# Patient Record
Sex: MALE | Race: WHITE | HISPANIC OR LATINO | Employment: OTHER | URBAN - METROPOLITAN AREA
[De-identification: names, ages, dates, MRNs, and addresses within clinical notes are randomized per-mention and may not be internally consistent; named-entity substitution may affect disease eponyms.]

---

## 2023-07-18 ENCOUNTER — APPOINTMENT (OUTPATIENT)
Dept: GENERAL RADIOLOGY | Facility: CLINIC | Age: 58
End: 2023-07-18
Attending: EMERGENCY MEDICINE
Payer: COMMERCIAL

## 2023-07-18 ENCOUNTER — HOSPITAL ENCOUNTER (EMERGENCY)
Facility: CLINIC | Age: 58
Discharge: HOME | End: 2023-07-18
Attending: EMERGENCY MEDICINE | Admitting: EMERGENCY MEDICINE
Payer: COMMERCIAL

## 2023-07-18 VITALS
SYSTOLIC BLOOD PRESSURE: 136 MMHG | OXYGEN SATURATION: 98 % | TEMPERATURE: 97.3 F | HEART RATE: 61 BPM | DIASTOLIC BLOOD PRESSURE: 87 MMHG | RESPIRATION RATE: 18 BRPM

## 2023-07-18 DIAGNOSIS — S62.657A NONDISPLACED FRACTURE OF MIDDLE PHALANX OF LEFT LITTLE FINGER, INITIAL ENCOUNTER FOR CLOSED FRACTURE: ICD-10-CM

## 2023-07-18 PROCEDURE — 99284 EMERGENCY DEPT VISIT MOD MDM: CPT | Mod: 25

## 2023-07-18 PROCEDURE — 73140 X-RAY EXAM OF FINGER(S): CPT | Mod: LT

## 2023-07-18 PROCEDURE — 250N000013 HC RX MED GY IP 250 OP 250 PS 637: Performed by: EMERGENCY MEDICINE

## 2023-07-18 PROCEDURE — 26720 TREAT FINGER FRACTURE EACH: CPT | Mod: F4

## 2023-07-18 RX ORDER — IBUPROFEN 800 MG/1
800 TABLET, FILM COATED ORAL ONCE
Status: COMPLETED | OUTPATIENT
Start: 2023-07-18 | End: 2023-07-18

## 2023-07-18 RX ADMIN — IBUPROFEN 800 MG: 800 TABLET, FILM COATED ORAL at 12:52

## 2023-07-18 ASSESSMENT — ACTIVITIES OF DAILY LIVING (ADL): ADLS_ACUITY_SCORE: 33
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
## 2023-07-18 NOTE — ED TRIAGE NOTES
used. Pt states a dry wall sheet fell on L hand, pinky finger yesterday. CMS intact. Denies injuries elsewhere.

## 2023-07-18 NOTE — DISCHARGE INSTRUCTIONS
Please use the splint to help immobilize the finger  Follow-up with Hand Surgery at [facility] for recheck later this week.  They may place you in a splint/cast    No heavy lifting with left hand until cleared by specialist    Return to ER with worsening pain, swelling, or any other concerns.

## 2023-09-05 ENCOUNTER — APPOINTMENT (OUTPATIENT)
Dept: GENERAL RADIOLOGY | Facility: CLINIC | Age: 58
End: 2023-09-05
Attending: EMERGENCY MEDICINE
Payer: COMMERCIAL

## 2023-09-05 ENCOUNTER — HOSPITAL ENCOUNTER (EMERGENCY)
Facility: CLINIC | Age: 58
Discharge: HOME | End: 2023-09-05
Attending: EMERGENCY MEDICINE | Admitting: EMERGENCY MEDICINE
Payer: COMMERCIAL

## 2023-09-05 VITALS
TEMPERATURE: 98.4 F | HEART RATE: 72 BPM | HEIGHT: 67 IN | OXYGEN SATURATION: 98 % | SYSTOLIC BLOOD PRESSURE: 138 MMHG | BODY MASS INDEX: 30.45 KG/M2 | DIASTOLIC BLOOD PRESSURE: 87 MMHG | WEIGHT: 194 LBS | RESPIRATION RATE: 18 BRPM

## 2023-09-05 DIAGNOSIS — S69.92XA INJURY OF FINGER OF LEFT HAND, INITIAL ENCOUNTER: ICD-10-CM

## 2023-09-05 PROCEDURE — 99283 EMERGENCY DEPT VISIT LOW MDM: CPT

## 2023-09-05 PROCEDURE — 73140 X-RAY EXAM OF FINGER(S): CPT | Mod: LT

## 2023-09-05 NOTE — ED PROVIDER NOTES
"    History     Chief Complaint:  finger pain  History of performed via professional       HPI   Trevor Palma is a 57 year old male who presents for evaluation of persistent left fifth finger pain.  He states that in mid July, he injured his left pinky finger and was seen in acute care, found to have a fracture of the middle phalanx.  He was splinted at that time and is now discontinued the splint.  Unfortunately, he has ongoing pain with any flexion of the PIP or MCP to the PIP joint.  He has had no additional injury.  He denies redness, swelling, fever, or any other concerns.      Independent Historian:    None    Review of External Notes:  Reviewed 7/28/2023 office visit        Physical Exam   Patient Vitals for the past 24 hrs:   BP Temp Temp src Pulse Resp SpO2 Height Weight   09/05/23 0944 138/87 98.4  F (36.9  C) Temporal 72 18 98 % 1.702 m (5' 7\") 88 kg (194 lb 0.1 oz)        Physical Exam  Constitutional:  Alert, attentive  Cardiovascular:  2+ radial and ulnar pulses to the bilateral upper extremities   Neurological:  5/5 strength to the radian, ulnar and median motor distributions;      sensation intact to light touch to the radian, ulnar and median distributions  MSK:   Normal inspection to the left hand and fingers.  Reduced range of motion at the PIP and MCP due to pain.  Mild tenderness to the left fifth finger PIP.  No redness, swelling, or pain out of proportion to exam  Skin:    Skin is warm and dry.       Emergency Department Course     Imaging:  Fingers XR, 2-3 views, left   Final Result   IMPRESSION: The left small finger is negative for fracture or   dislocation. Again seen is a minimal lucency along the distal margin   of the proximal phalanx but this is unchanged from the prior   examination and could represent a tiny intraosseous ganglion cyst.      KAREN BATES MD            SYSTEM ID:  [id number]        Patient placed in capo tape splint by LEXA, fourth finger to the " fifth finger on the left    Independent Interpretation (X-rays, CTs, rhythm strip):  No acute fracture or dislocation on left finger x-ray       Disposition:  The patient was discharged to home.     Impression & Plan      Medical Decision Making:  This a pleasant 57-year-old male presents for evaluation of ongoing pain to the left fifth finger as described above.  X-ray today shows no residual fracture but the patient continues to have pain.  There is a lucency noted that may be related versus incidental.  Patient has capo taped fourth and fifth finger.  Plan orthopedic hand follow-up for further evaluation of ongoing supportive cares and conservative measures versus operative intervention.      Diagnosis:    ICD-10-CM    1. Injury of finger of left hand, initial encounter  S69.92XA             Kendell Rg MD  09/05/23 1237

## 2025-02-11 ENCOUNTER — OFFICE VISIT (OUTPATIENT)
Dept: INTERNAL MEDICINE | Facility: CLINIC | Age: 60
End: 2025-02-11

## 2025-02-11 VITALS
OXYGEN SATURATION: 96 % | TEMPERATURE: 97.4 F | SYSTOLIC BLOOD PRESSURE: 133 MMHG | HEART RATE: 71 BPM | BODY MASS INDEX: 32.01 KG/M2 | DIASTOLIC BLOOD PRESSURE: 86 MMHG | WEIGHT: 204.4 LBS | RESPIRATION RATE: 18 BRPM

## 2025-02-11 DIAGNOSIS — A37.90 PERTUSSIS: Primary | ICD-10-CM

## 2025-02-11 DIAGNOSIS — R05.1 ACUTE COUGH: ICD-10-CM

## 2025-02-11 DIAGNOSIS — K59.01 SLOW TRANSIT CONSTIPATION: ICD-10-CM

## 2025-02-11 PROBLEM — M62.838 MUSCLE SPASM: Status: ACTIVE | Noted: 2023-01-16

## 2025-02-11 PROBLEM — Z86.0100 PERSONAL HISTORY OF COLON POLYPS, UNSPECIFIED: Status: ACTIVE | Noted: 2025-02-11

## 2025-02-11 PROBLEM — R29.898 WEAKNESS OF RIGHT LOWER EXTREMITY: Status: ACTIVE | Noted: 2017-01-13

## 2025-02-11 PROBLEM — I11.9 HYPERTENSIVE LEFT VENTRICULAR HYPERTROPHY, WITHOUT HEART FAILURE: Status: ACTIVE | Noted: 2024-02-22

## 2025-02-11 PROBLEM — M53.3 SACROILIAC PAIN: Status: ACTIVE | Noted: 2023-01-16

## 2025-02-11 PROBLEM — G89.29 CHRONIC PAIN OF LEFT KNEE: Status: ACTIVE | Noted: 2025-02-11

## 2025-02-11 PROBLEM — E78.5 HYPERLIPIDEMIA: Status: ACTIVE | Noted: 2019-11-04

## 2025-02-11 PROBLEM — M54.6 CHRONIC THORACIC BACK PAIN: Status: ACTIVE | Noted: 2025-02-11

## 2025-02-11 PROBLEM — G89.29 CHRONIC THORACIC BACK PAIN: Status: ACTIVE | Noted: 2025-02-11

## 2025-02-11 PROBLEM — G57.90 ILIOINGUINAL NEURALGIA: Status: ACTIVE | Noted: 2020-11-23

## 2025-02-11 PROBLEM — M47.816 FACET ARTHROPATHY, LUMBAR: Status: ACTIVE | Noted: 2023-01-02

## 2025-02-11 PROBLEM — G89.29 ACUTE EXACERBATION OF CHRONIC LOW BACK PAIN: Status: ACTIVE | Noted: 2022-05-16

## 2025-02-11 PROBLEM — M25.562 CHRONIC PAIN OF LEFT KNEE: Status: ACTIVE | Noted: 2025-02-11

## 2025-02-11 PROBLEM — G51.0 FACIAL PARESIS: Status: ACTIVE | Noted: 2017-01-13

## 2025-02-11 PROBLEM — M51.369 DDD (DEGENERATIVE DISC DISEASE), LUMBAR: Status: ACTIVE | Noted: 2023-01-16

## 2025-02-11 PROBLEM — S83.231A COMPLEX TEAR OF MEDIAL MENISCUS OF RIGHT KNEE AS CURRENT INJURY: Status: ACTIVE | Noted: 2023-12-08

## 2025-02-11 PROBLEM — M17.11 UNILATERAL PRIMARY OSTEOARTHRITIS, RIGHT KNEE: Status: ACTIVE | Noted: 2025-02-11

## 2025-02-11 PROBLEM — M54.50 ACUTE EXACERBATION OF CHRONIC LOW BACK PAIN: Status: ACTIVE | Noted: 2022-05-16

## 2025-02-11 PROBLEM — M25.561 ACUTE PAIN OF RIGHT KNEE: Status: ACTIVE | Noted: 2023-12-08

## 2025-02-11 PROBLEM — R53.82 CHRONIC FATIGUE: Status: ACTIVE | Noted: 2019-11-04

## 2025-02-11 PROCEDURE — 87798 DETECT AGENT NOS DNA AMP: CPT

## 2025-02-11 PROCEDURE — 99204 OFFICE O/P NEW MOD 45 MIN: CPT

## 2025-02-11 RX ORDER — IBUPROFEN 600 MG/1
TABLET, FILM COATED ORAL
COMMUNITY
Start: 2024-02-13 | End: 2025-02-11

## 2025-02-11 RX ORDER — SILDENAFIL CITRATE 20 MG/1
TABLET ORAL
COMMUNITY
Start: 2024-05-07

## 2025-02-11 RX ORDER — SILDENAFIL 100 MG/1
TABLET, FILM COATED ORAL
COMMUNITY
Start: 2024-07-30

## 2025-02-11 RX ORDER — SENNA AND DOCUSATE SODIUM 50; 8.6 MG/1; MG/1
1 TABLET, FILM COATED ORAL AT BEDTIME
Qty: 60 TABLET | Refills: 1 | Status: SHIPPED | OUTPATIENT
Start: 2025-02-11

## 2025-02-11 RX ORDER — NAPROXEN 500 MG/1
TABLET ORAL
COMMUNITY
Start: 2024-02-23 | End: 2025-02-11

## 2025-02-11 RX ORDER — AMOXICILLIN 500 MG/1
1 TABLET, FILM COATED ORAL
COMMUNITY
Start: 2024-05-20 | End: 2025-02-11

## 2025-02-11 RX ORDER — BENZONATATE 200 MG/1
200 CAPSULE ORAL 3 TIMES DAILY PRN
Qty: 42 CAPSULE | Refills: 0 | Status: SHIPPED | OUTPATIENT
Start: 2025-02-11

## 2025-02-11 RX ORDER — AZITHROMYCIN 250 MG/1
TABLET, FILM COATED ORAL
Qty: 6 TABLET | Refills: 0 | Status: SHIPPED | OUTPATIENT
Start: 2025-02-11 | End: 2025-02-16

## 2025-02-11 ASSESSMENT — ENCOUNTER SYMPTOMS: COUGH: 1

## 2025-02-11 NOTE — PROGRESS NOTES
Assessment & Plan     Pertussis  Patient's last Tdap vaccination was 9/14/2015  - B. pertussis/parapertussis PCR-NP  - azithromycin (ZITHROMAX) 250 MG tablet; Take 2 tablets (500 mg) by mouth daily for 1 day, THEN 1 tablet (250 mg) daily for 4 days.    Acute cough  Pt reports that for the last 6 weeks pt has had a persistent dry cough and HA at times from the straining of coughing. Patient states that his cough is so bad he almost throws up.  - benzonatate (TESSALON) 200 MG capsule; Take 1 capsule (200 mg) by mouth 3 times daily as needed for cough.    Slow transit constipation  Patient has hypoactive bowel sounds x 4 and reports that he has difficulties having a bowel movement   - SENNA-docusate sodium (SENNA S) 8.6-50 MG tablet; Take 1 tablet by mouth at bedtime.              ,   Gonsalo Murray is a 59 year old, presenting for the following health issues: Pt reports that for the last 6 weeks pt has had a persistent dry cough and HA at times from the straining of coughing. Patient states that his cough is so bad he almost throws up. Pt endorses night sweats. No nasal congestion, no sore throat, no SOB, no chest pain. Pt reports that he was prescribed methylprednisolone, bennadryl and an inhaler that was prescribed in [place] and the cough did not resolve and he developed a rash as a reaction to one of the medications he took.   Patient's last Tdap vaccination was 9/14/2015.  Nobody else in his family has shown the same symptoms.  Cough (Had a horrible cough for about x 1month and half. Went to [place] and came back then went to [place] and still sick.) and Derm Problem (Took medication and I started having rashes that itches.)        2/11/2025     1:13 PM   Additional Questions   Roomed by Dakota Snider CMA   Accompanied by Self         2/11/2025     1:13 PM   Patient Reported Additional Medications   Patient reports taking the following new medications no     History of Present Illness       Reason for visit:   A lot of coughing   He is taking medications regularly.                 Review of Systems  Constitutional, HEENT, cardiovascular, pulmonary, gi and gu systems are negative, except as otherwise noted.      Objective    /86 (BP Location: Right arm, Patient Position: Sitting, Cuff Size: Adult Regular)   Pulse 71   Temp 97.4  F (36.3  C) (Oral)   Resp 18   Wt 92.7 kg (204 lb 6.4 oz)   SpO2 96%   BMI 32.01 kg/m    Body mass index is 32.01 kg/m .  Physical Exam   GENERAL: alert and no distress  HENT: normal cephalic/atraumatic, right ear: normal: no effusions, no erythema, normal landmarks, left ear: normal: no effusions, no erythema, normal landmarks, nose and mouth without ulcers or lesions, oropharynx erythematous, and oral mucous membranes moist   NECK: no adenopathy, no asymmetry, masses, or scars  RESP: lungs clear to auscultation - no rales, rhonchi or wheezes  CV: regular rate and rhythm, normal S1 S2, no S3 or S4, no murmur, click or rub, no peripheral edema  ABDOMEN: soft, nontender, without hepatosplenomegaly or masses and bowel sounds hypoactive x 4  MS: no gross musculoskeletal defects noted, no edema  SKIN: Hive like rash noted on the anterior aspect of the left forearm, no warmth to the touch, no drainage, appears like it is fading  NEURO: Normal strength and tone, mentation intact and speech normal  PSYCH: mentation appears normal, affect normal/bright            Signed Electronically by: NISH Beard CNP

## 2025-02-11 NOTE — PATIENT INSTRUCTIONS
Complete azithromycin antibiotic regimen.   Take antibiotic with food to reduce stomach upset.   Take a probiotic or eat yogurt to further reduce stomach upset.   Return to office if symptoms get worse or if you develop a fever    Take Tessalon perles 3 times a day as needed for cough.

## 2025-02-12 LAB
B PARAPERT DNA SPEC QL NAA+PROBE: NOT DETECTED
B PERT DNA SPEC QL NAA+PROBE: NOT DETECTED

## 2025-02-17 ENCOUNTER — TELEPHONE (OUTPATIENT)
Dept: INTERNAL MEDICINE | Facility: CLINIC | Age: 60
End: 2025-02-17

## 2025-02-17 NOTE — TELEPHONE ENCOUNTER
Attempted to contact pt via . They said someone answered but could not hear them. Will send letter, since Negative. Please try again.

## 2025-02-17 NOTE — LETTER
February 17, 2025      Trevor Palma  [address] [facility] [address]  [place] [facility] [place] [address]        Dear Mr. Joesph Olivierz,    We are writing to inform you of your test results.    Your test for Pertussis, (whooping cough) was Negative.       If you have any questions or concerns, please call the clinic at the number listed above.       Sincerely,        NISH Melendez [facility]

## 2025-02-18 NOTE — TELEPHONE ENCOUNTER
Called patient using  ID # [id number].  Relayed provider's message below. Pt verbalized understanding.